# Patient Record
Sex: FEMALE | Race: ASIAN | NOT HISPANIC OR LATINO | URBAN - METROPOLITAN AREA
[De-identification: names, ages, dates, MRNs, and addresses within clinical notes are randomized per-mention and may not be internally consistent; named-entity substitution may affect disease eponyms.]

---

## 2019-07-26 ENCOUNTER — EMERGENCY (EMERGENCY)
Facility: HOSPITAL | Age: 44
LOS: 1 days | Discharge: HOME | End: 2019-07-26
Attending: EMERGENCY MEDICINE | Admitting: EMERGENCY MEDICINE
Payer: COMMERCIAL

## 2019-07-26 VITALS
HEART RATE: 79 BPM | OXYGEN SATURATION: 100 % | SYSTOLIC BLOOD PRESSURE: 103 MMHG | DIASTOLIC BLOOD PRESSURE: 71 MMHG | TEMPERATURE: 98 F | RESPIRATION RATE: 16 BRPM

## 2019-07-26 PROCEDURE — 12001 RPR S/N/AX/GEN/TRNK 2.5CM/<: CPT | Mod: F3

## 2019-07-26 PROCEDURE — 99282 EMERGENCY DEPT VISIT SF MDM: CPT | Mod: 25

## 2019-07-26 RX ORDER — TETANUS TOXOID, REDUCED DIPHTHERIA TOXOID AND ACELLULAR PERTUSSIS VACCINE, ADSORBED 5; 2.5; 8; 8; 2.5 [IU]/.5ML; [IU]/.5ML; UG/.5ML; UG/.5ML; UG/.5ML
0.5 SUSPENSION INTRAMUSCULAR ONCE
Refills: 0 | Status: DISCONTINUED | OUTPATIENT
Start: 2019-07-26 | End: 2019-07-30

## 2019-07-26 RX ORDER — ACETAMINOPHEN 500 MG
650 TABLET ORAL ONCE
Refills: 0 | Status: DISCONTINUED | OUTPATIENT
Start: 2019-07-26 | End: 2019-07-30

## 2019-07-26 NOTE — ED PROVIDER NOTE - PROGRESS NOTE DETAILS
2 sutures placed  Rodrick Mccarthy MD, PGY3 Emergency Medicine 2 sutures placed. During admit pt confirmed she had had tetanus shot in last five years and declined tdap.   Rodrick Mccarthy MD, PGY3 Emergency Medicine

## 2019-07-26 NOTE — ED PROVIDER NOTE - ATTENDING CONTRIBUTION TO CARE
Attending Attestation: Dr. Henning  I have personally performed a history and physical examination of the patient and discussed management with the resident as well as the patient.  I reviewed the resident's note and agree with the documented findings and plan of care.  I have authored and modified critical sections of the Provider Note, including but not limited to HPI, Physical Exam and MDM. 43F with laceration to distal portion of L 4th finger. Will clean wound, pt feels uncomfortable without sutures so will place 2 sutures, worried about healing. Will administer tetanus shot and recommend f/u with PMD for further eval.

## 2019-07-26 NOTE — ED ADULT NURSE NOTE - OBJECTIVE STATEMENT
Patient arrived to ED with laceration to 4th left digit, non-active bleed observed. Provider currently in room repairing laceration.

## 2019-07-26 NOTE — ED PROVIDER NOTE - CLINICAL SUMMARY MEDICAL DECISION MAKING FREE TEXT BOX
43F with laceration to distal portion of L 4th finger. Will clean wound, likely no suture repair, Will administer tetanus shot and recommend f/u with PMD for further eval. 43F with laceration to distal portion of L 4th finger. Will clean wound, pt feels uncomfortable without sutures so will place 2 sutures, worried about healing. Will administer tetanus shot and recommend f/u with PMD for further eval.

## 2019-07-26 NOTE — ED PROVIDER NOTE - PHYSICAL EXAMINATION
General: Well appearing, alert, oriented, no acute distress. Resting in bed.  HEENT: PERRLA EOMI. No trauma/bruising noted to head or face.   MSK: Full ROM of upper and lower extremities bilaterally. Full ROM of neck. No gross deformities noted to extremities.  Neuro: Awake, A+O x4, moving all extremities spontaneously. CN 2-12 grossly intact. Strength and sensation grossly intact to all extremities. Ambulatory w/o assist, normal gait.   Extremities: No swelling or edema noted to extremities. No calf tenderness to palpation.   Skin: No rash or bruising noted on exam.     Left hand 4th finger with laceration to distal finger, with lac dorsally through nail into pad of finger tip with flap of skin noted

## 2019-07-26 NOTE — ED PROCEDURE NOTE - ATTENDING CONTRIBUTION TO CARE
Attending Attestation: Dr. Juvencio LARRY supervised the resident and was personally present during key portions of the procedure.

## 2019-07-26 NOTE — ED PROVIDER NOTE - OBJECTIVE STATEMENT
43 YOF with no significant PMHx presenting with Laceration to tip of L hand 4th finger. Pt states was cutting onions when knife slipped, cutting top of her nail/finger. No systemic symptoms at this time. No numbness to finger. Doesn't recall last tetanus shot. No allergies, no active bleeding from finger while in bed. No other acute complaints at time of eval.

## 2019-07-26 NOTE — ED PROVIDER NOTE - NSFOLLOWUPINSTRUCTIONS_ED_ALL_ED_FT
Please follow up with primary medical doctor for follow up care    Keep the wound clean, dry and covered for the next 48 hours, after which you may allow soap and water to gently run over the wound  Apply bacitracin daily   Avoid soaking wound, for example in bathtubs or hottubs  Return to the hospital for signs of infection such as fever, redness, discharge, if the sutures open, or if you have any other questions or concerns.    Take Tylenol up to 650 mg every 6 hours as needed for pain.  Take motrin 600mg every 6 hours as needed for pain